# Patient Record
Sex: MALE | Race: NATIVE HAWAIIAN OR OTHER PACIFIC ISLANDER | URBAN - NONMETROPOLITAN AREA
[De-identification: names, ages, dates, MRNs, and addresses within clinical notes are randomized per-mention and may not be internally consistent; named-entity substitution may affect disease eponyms.]

---

## 2021-02-12 ENCOUNTER — OFFICE VISIT (OUTPATIENT)
Dept: URBAN - NONMETROPOLITAN AREA CLINIC 4 | Facility: CLINIC | Age: 76
End: 2021-02-12
Payer: COMMERCIAL

## 2021-02-12 ENCOUNTER — LAB OUTSIDE AN ENCOUNTER (OUTPATIENT)
Dept: URBAN - NONMETROPOLITAN AREA CLINIC 4 | Facility: CLINIC | Age: 76
End: 2021-02-12

## 2021-02-12 VITALS
BODY MASS INDEX: 23.99 KG/M2 | HEIGHT: 73 IN | TEMPERATURE: 97 F | SYSTOLIC BLOOD PRESSURE: 113 MMHG | WEIGHT: 181 LBS | DIASTOLIC BLOOD PRESSURE: 68 MMHG | HEART RATE: 66 BPM

## 2021-02-12 DIAGNOSIS — R63.4 WEIGHT LOSS: ICD-10-CM

## 2021-02-12 DIAGNOSIS — I10 HTN (HYPERTENSION): ICD-10-CM

## 2021-02-12 DIAGNOSIS — R19.7 DIARRHEA: ICD-10-CM

## 2021-02-12 DIAGNOSIS — F03.90 DEMENTIA: ICD-10-CM

## 2021-02-12 DIAGNOSIS — J44.9 COPD (CHRONIC OBSTRUCTIVE PULMONARY DISEASE): ICD-10-CM

## 2021-02-12 DIAGNOSIS — N18.9 CKD (CHRONIC KIDNEY DISEASE): ICD-10-CM

## 2021-02-12 DIAGNOSIS — E11.9 DIABETES: ICD-10-CM

## 2021-02-12 PROCEDURE — G8482 FLU IMMUNIZE ORDER/ADMIN: HCPCS | Performed by: REGISTERED NURSE

## 2021-02-12 PROCEDURE — 1036F TOBACCO NON-USER: CPT | Performed by: REGISTERED NURSE

## 2021-02-12 PROCEDURE — G8754 DIAS BP LESS 90: HCPCS | Performed by: REGISTERED NURSE

## 2021-02-12 PROCEDURE — G8420 CALC BMI NORM PARAMETERS: HCPCS | Performed by: REGISTERED NURSE

## 2021-02-12 PROCEDURE — G8752 SYS BP LESS 140: HCPCS | Performed by: REGISTERED NURSE

## 2021-02-12 PROCEDURE — 99204 OFFICE O/P NEW MOD 45 MIN: CPT | Performed by: REGISTERED NURSE

## 2021-02-12 PROCEDURE — G8428 CUR MEDS NOT DOCUMENT: HCPCS | Performed by: REGISTERED NURSE

## 2021-02-12 RX ORDER — SODIUM, POTASSIUM,MAG SULFATES 17.5-3.13G
354 ML SOLUTION, RECONSTITUTED, ORAL ORAL
Qty: 354 ML | Refills: 0 | OUTPATIENT
Start: 2021-02-12 | End: 2021-02-13

## 2021-02-12 NOTE — HPI-TODAY'S VISIT:
2/12/21: Pt is a 76 yo male with PMH of HTN, DM, COPD, CKD, dementia, former smoker who was referred by Dr. Wallis for chronic diarrhea and weight loss.   Pt reports diarrhea with urgency over the past 3-4 months. Typically has 6 BMs daily. Denies any abdominal pain, N/V, hematochezia, melena, nocturnal stools or incontinence. He has lost over 100 lbs in the past year due to progressive dementia. He has a good appetite. He has never had a colonoscopy. Denies FHx of CRC or colon polyps. Stool studies on 1/27/21 checked by PCP were unremarkable. Hgb noted at 13.5. Denies etoh, tobacco or drug use.

## 2021-03-22 ENCOUNTER — CLAIMS CREATED FROM THE CLAIM WINDOW (OUTPATIENT)
Dept: URBAN - METROPOLITAN AREA CLINIC 4 | Facility: CLINIC | Age: 76
End: 2021-03-22
Payer: COMMERCIAL

## 2021-03-22 ENCOUNTER — OFFICE VISIT (OUTPATIENT)
Dept: URBAN - METROPOLITAN AREA SURGERY CENTER 14 | Facility: SURGERY CENTER | Age: 76
End: 2021-03-22
Payer: COMMERCIAL

## 2021-03-22 DIAGNOSIS — K52.9 ACUTE COLITIS: ICD-10-CM

## 2021-03-22 DIAGNOSIS — K63.5 BENIGN COLON POLYP: ICD-10-CM

## 2021-03-22 DIAGNOSIS — K63.89 MASS OF HEPATIC FLEXURE OF COLON: ICD-10-CM

## 2021-03-22 PROCEDURE — 45385 COLONOSCOPY W/LESION REMOVAL: CPT | Performed by: INTERNAL MEDICINE

## 2021-03-22 PROCEDURE — 88305 TISSUE EXAM BY PATHOLOGIST: CPT | Performed by: PATHOLOGY

## 2021-03-22 PROCEDURE — G8907 PT DOC NO EVENTS ON DISCHARG: HCPCS | Performed by: INTERNAL MEDICINE

## 2021-03-22 PROCEDURE — 45380 COLONOSCOPY AND BIOPSY: CPT | Performed by: INTERNAL MEDICINE

## 2021-03-22 PROCEDURE — 88342 IMHCHEM/IMCYTCHM 1ST ANTB: CPT | Performed by: PATHOLOGY

## 2021-03-22 PROCEDURE — 88313 SPECIAL STAINS GROUP 2: CPT | Performed by: PATHOLOGY

## 2021-04-02 ENCOUNTER — DASHBOARD ENCOUNTERS (OUTPATIENT)
Age: 76
End: 2021-04-02

## 2021-04-02 ENCOUNTER — WEB ENCOUNTER (OUTPATIENT)
Dept: URBAN - NONMETROPOLITAN AREA CLINIC 4 | Facility: CLINIC | Age: 76
End: 2021-04-02

## 2021-04-02 ENCOUNTER — OFFICE VISIT (OUTPATIENT)
Dept: URBAN - NONMETROPOLITAN AREA CLINIC 4 | Facility: CLINIC | Age: 76
End: 2021-04-02
Payer: COMMERCIAL

## 2021-04-02 VITALS
WEIGHT: 181.6 LBS | TEMPERATURE: 97.3 F | BODY MASS INDEX: 24.07 KG/M2 | HEART RATE: 68 BPM | DIASTOLIC BLOOD PRESSURE: 60 MMHG | HEIGHT: 73 IN | SYSTOLIC BLOOD PRESSURE: 108 MMHG

## 2021-04-02 DIAGNOSIS — I10 HTN (HYPERTENSION): ICD-10-CM

## 2021-04-02 DIAGNOSIS — K63.5 HYPERPLASTIC POLYP OF SIGMOID COLON: ICD-10-CM

## 2021-04-02 DIAGNOSIS — R19.7 DIARRHEA: ICD-10-CM

## 2021-04-02 DIAGNOSIS — F03.90 DEMENTIA: ICD-10-CM

## 2021-04-02 DIAGNOSIS — N18.9 CKD (CHRONIC KIDNEY DISEASE): ICD-10-CM

## 2021-04-02 DIAGNOSIS — E11.9 DIABETES: ICD-10-CM

## 2021-04-02 DIAGNOSIS — J44.9 COPD (CHRONIC OBSTRUCTIVE PULMONARY DISEASE): ICD-10-CM

## 2021-04-02 PROBLEM — 709044004 CHRONIC KIDNEY DISEASE: Status: ACTIVE | Noted: 2021-02-12

## 2021-04-02 PROBLEM — 52448006 DEMENTIA: Status: ACTIVE | Noted: 2021-02-12

## 2021-04-02 PROBLEM — 38341003 HYPERTENSION: Status: ACTIVE | Noted: 2021-02-12

## 2021-04-02 PROBLEM — 13645005 COPD - CHRONIC OBSTRUCTIVE PULMONARY DISEASE: Status: ACTIVE | Noted: 2021-02-12

## 2021-04-02 PROBLEM — 111552007 DIABETES MELLITUS WITHOUT COMPLICATION: Status: ACTIVE | Noted: 2021-02-12

## 2021-04-02 PROCEDURE — 99213 OFFICE O/P EST LOW 20 MIN: CPT | Performed by: REGISTERED NURSE

## 2021-04-02 NOTE — HPI-TODAY'S VISIT:
2/12/21: Pt is a 74 yo male with PMH of HTN, DM, COPD, CKD, dementia, former smoker who was referred by Dr. Wallis for chronic diarrhea and weight loss.   Pt reports diarrhea with urgency over the past 3-4 months. Typically has 6 BMs daily. Denies any abdominal pain, N/V, hematochezia, melena, nocturnal stools or incontinence. He has lost over 100 lbs in the past year due to progressive dementia. He has a good appetite. He has never had a colonoscopy. Denies FHx of CRC or colon polyps. Stool studies on 1/27/21 checked by PCP were unremarkable. Hgb noted at 13.5. Denies etoh, tobacco or drug use.  4/2/21: Pt RTC for f/u. Underwent colonoscopy on 3/22/21 which showed one 4 mm hyperplastic polyp in sigmoid colon. Random colon bx neg for MC. Diarrhea improved. Having 2-3 BMs daily. Stools are semi-formed. Denies any other GI complaints.

## 2024-05-03 ENCOUNTER — OFFICE VISIT (OUTPATIENT)
Dept: OBGYN CLINIC | Facility: CLINIC | Age: 79
End: 2024-05-03

## 2024-05-03 ENCOUNTER — APPOINTMENT (OUTPATIENT)
Dept: RADIOLOGY | Facility: CLINIC | Age: 79
End: 2024-05-03
Payer: COMMERCIAL

## 2024-05-03 VITALS
SYSTOLIC BLOOD PRESSURE: 102 MMHG | HEIGHT: 73 IN | HEART RATE: 69 BPM | DIASTOLIC BLOOD PRESSURE: 70 MMHG | BODY MASS INDEX: 21.12 KG/M2 | WEIGHT: 159.4 LBS

## 2024-05-03 DIAGNOSIS — M54.50 LOW BACK PAIN, UNSPECIFIED BACK PAIN LATERALITY, UNSPECIFIED CHRONICITY, UNSPECIFIED WHETHER SCIATICA PRESENT: ICD-10-CM

## 2024-05-03 DIAGNOSIS — S22.31XA CLOSED FRACTURE OF ONE RIB OF RIGHT SIDE, INITIAL ENCOUNTER: Primary | ICD-10-CM

## 2024-05-03 PROCEDURE — 99204 OFFICE O/P NEW MOD 45 MIN: CPT | Performed by: ORTHOPAEDIC SURGERY

## 2024-05-03 PROCEDURE — 72100 X-RAY EXAM L-S SPINE 2/3 VWS: CPT

## 2024-05-03 RX ORDER — DONEPEZIL HYDROCHLORIDE 5 MG/1
TABLET, FILM COATED ORAL
COMMUNITY
Start: 2024-04-06

## 2024-05-03 RX ORDER — CARVEDILOL 3.12 MG/1
TABLET ORAL
COMMUNITY
Start: 2024-02-07

## 2024-05-03 RX ORDER — TAMSULOSIN HYDROCHLORIDE 0.4 MG/1
CAPSULE ORAL
COMMUNITY
Start: 2024-02-07

## 2024-05-03 RX ORDER — SEMAGLUTIDE 0.68 MG/ML
INJECTION, SOLUTION SUBCUTANEOUS
COMMUNITY
Start: 2024-03-10

## 2024-05-03 RX ORDER — FLUOXETINE HYDROCHLORIDE 20 MG/1
CAPSULE ORAL
COMMUNITY
Start: 2024-02-21

## 2024-05-03 RX ORDER — ROSUVASTATIN CALCIUM 20 MG/1
TABLET, COATED ORAL
COMMUNITY
Start: 2024-04-15

## 2024-05-03 RX ORDER — MEMANTINE HYDROCHLORIDE 10 MG/1
TABLET ORAL
COMMUNITY
Start: 2024-01-30

## 2024-05-03 RX ORDER — BLOOD SUGAR DIAGNOSTIC
STRIP MISCELLANEOUS
COMMUNITY
Start: 2024-03-02

## 2024-05-03 RX ORDER — RANOLAZINE 500 MG/1
TABLET, EXTENDED RELEASE ORAL
COMMUNITY
Start: 2024-01-30

## 2024-05-03 NOTE — PROGRESS NOTES
Assessment/Plan:  1. Closed fracture of one rib of right side, initial encounter  XR spine lumbar 2 or 3 views injury          Darwin has pain directly upon his 12th rib on the right side.  X-rays of the lumbar spine show no acute abnormality in the lumbar spine region however there does appear to be a minimally displaced fracture to the 12th rib that correlates with his fall and pain.  I recommended conservative treatment of pain control and activity as tolerated and they rib fracture should heal the next 6 weeks.  He was recommended to do topical treatment with Lidoderm patches.  He does not appear to have any urinary issues associated with renal injury at this location as well.  He will follow-up with me only if needed.    Subjective:   Darwin Ram is a 78 y.o. male who presents to the office for evaluation for lower back pain.  He presents alongside his brother who states he is recently moved in with him and over the last week Darwin has fallen in the house about 3 or 4 times.  He has landed on his back several times tripping over objects in the house.  He has had increased pain over his right lower back ever since his most recent fall.  He denies any numbness or tingling rating pain down his legs.  He points to his right flank region where he is feeling the pain.  He denies any history of back issues in the past.  He denies any hematuria dysuria.      Review of Systems   Constitutional:  Negative for chills, fever and unexpected weight change.   HENT:  Negative for hearing loss, nosebleeds and sore throat.    Eyes:  Negative for pain, redness and visual disturbance.   Respiratory:  Negative for cough, shortness of breath and wheezing.    Cardiovascular:  Negative for chest pain, palpitations and leg swelling.   Gastrointestinal:  Negative for abdominal pain, nausea and vomiting.   Endocrine: Negative for polyphagia and polyuria.   Genitourinary:  Negative for dysuria and hematuria.   Musculoskeletal:          See HPI   Skin:  Negative for rash and wound.   Neurological:  Negative for dizziness, numbness and headaches.   Psychiatric/Behavioral:  Negative for decreased concentration and suicidal ideas. The patient is not nervous/anxious.          Past Medical History:   Diagnosis Date    Dementia (HCC)     Diabetes (HCC)     Hypertension        Past Surgical History:   Procedure Laterality Date    BACK SURGERY      CARDIAC SURGERY         Family History   Problem Relation Age of Onset    Stroke Father     Breast cancer Sister     Prostate cancer Brother     Skin cancer Brother        Social History     Occupational History    Not on file   Tobacco Use    Smoking status: Never    Smokeless tobacco: Never   Vaping Use    Vaping status: Never Used   Substance and Sexual Activity    Alcohol use: Never    Drug use: Never    Sexual activity: Not on file         Current Outpatient Medications:     carvedilol (COREG) 3.125 mg tablet, , Disp: , Rfl:     donepezil (ARICEPT) 5 mg tablet, , Disp: , Rfl:     FLUoxetine (PROzac) 20 mg capsule, , Disp: , Rfl:     memantine (NAMENDA) 10 mg tablet, , Disp: , Rfl:     OneTouch Ultra test strip, , Disp: , Rfl:     Ozempic, 0.25 or 0.5 MG/DOSE, 2 MG/3ML injection pen, , Disp: , Rfl:     ranolazine (RANEXA) 500 mg 12 hr tablet, , Disp: , Rfl:     rosuvastatin (CRESTOR) 20 MG tablet, , Disp: , Rfl:     tamsulosin (FLOMAX) 0.4 mg, , Disp: , Rfl:     No Known Allergies    Objective:  Vitals:    05/03/24 1429   BP: 102/70   Pulse: 69     Pain Score:   9      Back Exam     Tenderness   Back tenderness location: Tenderness palpation over 12th rib on the right side.  No rib tenderness on the left side.  No midline spinal tenderness.    Range of Motion   Extension:  normal   Flexion:  normal     Muscle Strength   Right Quadriceps:  5/5   Left Quadriceps:  5/5   Right Hamstrings:  5/5   Left Hamstrings:  5/5     Tests   Straight leg raise right: negative  Straight leg raise left: negative    Other    Sensation: normal  Gait: normal             Physical Exam  Vitals reviewed.   Constitutional:       Appearance: He is well-developed.   HENT:      Head: Normocephalic and atraumatic.   Eyes:      Conjunctiva/sclera: Conjunctivae normal.      Pupils: Pupils are equal, round, and reactive to light.   Cardiovascular:      Rate and Rhythm: Normal rate.      Pulses: Normal pulses.   Pulmonary:      Effort: Pulmonary effort is normal. No respiratory distress.   Musculoskeletal:      Cervical back: Normal range of motion and neck supple.      Lumbar back: Negative right straight leg raise test and negative left straight leg raise test.      Comments: As noted in HPI   Skin:     General: Skin is warm and dry.   Neurological:      General: No focal deficit present.      Mental Status: He is alert and oriented to person, place, and time.   Psychiatric:         Mood and Affect: Mood normal.         Behavior: Behavior normal.         I have personally reviewed pertinent films in PACS and my interpretation is as follows:  X-rays of the lumbar spine demonstrate no acute abnormality of the lumbar spine.  Fracture visible in the 12th rib in both AP and lateral view.      This document was created using speech voice recognition software.   Grammatical errors, random word insertions, pronoun errors, and incomplete sentences are an occasional consequence of this system due to software limitations, ambient noise, and hardware issues.   Any formal questions or concerns about content, text, or information contained within the body of this dictation should be directly addressed to the provider for clarification.

## 2024-05-13 ENCOUNTER — OFFICE VISIT (OUTPATIENT)
Dept: FAMILY MEDICINE CLINIC | Facility: CLINIC | Age: 79
End: 2024-05-13
Payer: COMMERCIAL

## 2024-05-13 VITALS
TEMPERATURE: 96 F | BODY MASS INDEX: 21 KG/M2 | DIASTOLIC BLOOD PRESSURE: 60 MMHG | SYSTOLIC BLOOD PRESSURE: 90 MMHG | WEIGHT: 150 LBS | HEIGHT: 71 IN | HEART RATE: 92 BPM | RESPIRATION RATE: 20 BRPM

## 2024-05-13 DIAGNOSIS — E11.9 TYPE 2 DIABETES MELLITUS WITHOUT COMPLICATION, WITHOUT LONG-TERM CURRENT USE OF INSULIN (HCC): ICD-10-CM

## 2024-05-13 DIAGNOSIS — I25.10 CORONARY ARTERY DISEASE INVOLVING NATIVE HEART WITHOUT ANGINA PECTORIS, UNSPECIFIED VESSEL OR LESION TYPE: ICD-10-CM

## 2024-05-13 DIAGNOSIS — F03.90 DEMENTIA, UNSPECIFIED DEMENTIA SEVERITY, UNSPECIFIED DEMENTIA TYPE, UNSPECIFIED WHETHER BEHAVIORAL, PSYCHOTIC, OR MOOD DISTURBANCE OR ANXIETY (HCC): Primary | ICD-10-CM

## 2024-05-13 DIAGNOSIS — R39.9 LOWER URINARY TRACT SYMPTOMS (LUTS): ICD-10-CM

## 2024-05-13 DIAGNOSIS — E78.2 MIXED HYPERLIPIDEMIA: ICD-10-CM

## 2024-05-13 PROBLEM — Z95.1 HISTORY OF HEART BYPASS SURGERY: Status: ACTIVE | Noted: 2024-05-13

## 2024-05-13 PROCEDURE — 99203 OFFICE O/P NEW LOW 30 MIN: CPT | Performed by: NURSE PRACTITIONER

## 2024-05-13 RX ORDER — FLUTICASONE PROPIONATE 50 MCG
1 SPRAY, SUSPENSION (ML) NASAL DAILY
COMMUNITY
Start: 2024-05-02

## 2024-05-13 RX ORDER — FEXOFENADINE HCL 180 MG/1
180 TABLET ORAL DAILY
COMMUNITY

## 2024-05-13 RX ORDER — NITROGLYCERIN 0.4 MG/1
TABLET SUBLINGUAL
COMMUNITY
Start: 2024-05-02

## 2024-05-13 NOTE — PROGRESS NOTES
"Assessment/Plan:    1. Dementia, unspecified dementia severity, unspecified dementia type, unspecified whether behavioral, psychotic, or mood disturbance or anxiety (HCC)  Comments:  on namenda    2. Coronary artery disease involving native heart without angina pectoris, unspecified vessel or lesion type  Comments:  on ranexa and coreg    3. Mixed hyperlipidemia  Comments:  on statin    4. Lower urinary tract symptoms (LUTS)  Comments:  on flomax    5. Type 2 diabetes mellitus without complication, without long-term current use of insulin (HCC)  Comments:  on ozempic and as per brother no complications and will get records            No future appointments.        Subjective:      Patient ID: Darwin Ram is a 78 y.o. male.    Chief Complaint   Patient presents with   • Establish Care     Amor LLAMAS         Vitals:  BP 90/60   Pulse 92   Temp (!) 96 °F (35.6 °C)   Resp 20   Ht 5' 10.5\" (1.791 m)   Wt 68 kg (150 lb)   BMI 21.22 kg/m²     HPI.  New to practice.  Personal and family medical history reviewed.   Patient brought by brother.  Patient has dementia and recently moved from [place] and will be going in assisted living in [place] by end of this month and will be establishing with them there with PCP and will not establishing with us . Only came here today for flu and covid-19 vaccine and not available and will be going to local pharmacy.  Had follow up with old PCP last month and will be following with them on 5/29 virtually to do forms.  Complaint with medications and tolerating it well.  Brother does not want us to order any blood work as stated that had it done last month with PCP and also had follow up with his cardiologist last month too.            PHQ-2/9 Depression Screening    Little interest or pleasure in doing things: 0 - not at all  Feeling down, depressed, or hopeless: 0 - not at all  PHQ-2 Score: 0  PHQ-2 Interpretation: Negative depression screen             The following portions of " the patient's history were reviewed and updated as appropriate: allergies, current medications, past family history, past medical history, past social history, past surgical history and problem list.      Review of Systems   HENT: Negative.     Respiratory: Negative.     Cardiovascular: Negative.    Gastrointestinal: Negative.    Genitourinary:  Negative for difficulty urinating.   Neurological:  Negative for dizziness and headaches.         Objective:    Social History     Tobacco Use   Smoking Status Former   • Average packs/day: 3.0 packs/day for 46.0 years (138.0 ttl pk-yrs)   • Types: Cigarettes   • Start date: 1954   • Passive exposure: Never   Smokeless Tobacco Never       Allergies: No Known Allergies      Current Outpatient Medications   Medication Sig Dispense Refill   • ASPIRIN 81 PO Take by mouth in the morning     • carvedilol (COREG) 3.125 mg tablet Take by mouth 2 (two) times a day with meals     • Cyanocobalamin (VITAMIN B 12 PO) Take by mouth in the morning     • donepezil (ARICEPT) 5 mg tablet Take 5 mg by mouth daily at bedtime     • fexofenadine (ALLEGRA) 180 MG tablet Take 180 mg by mouth daily     • FLUoxetine (PROzac) 20 mg capsule Take 20 mg by mouth daily     • fluticasone (FLONASE) 50 mcg/act nasal spray 1 spray into each nostril daily     • memantine (NAMENDA) 10 mg tablet 10 mg 2 (two) times a day     • nitroglycerin (NITROSTAT) 0.4 mg SL tablet Place under the tongue every 5 (five) minutes as needed     • Omega-3 Fatty Acids (FISH OIL CONCENTRATE PO) Take by mouth 2 (two) times a day     • OneTouch Ultra test strip      • Ozempic, 0.25 or 0.5 MG/DOSE, 2 MG/3ML injection pen 0.25 mg every 7 days     • ranolazine (RANEXA) 500 mg 12 hr tablet in the morning     • rosuvastatin (CRESTOR) 20 MG tablet Take 20 mg by mouth daily     • tamsulosin (FLOMAX) 0.4 mg 0.4 mg daily with dinner       No current facility-administered medications for this visit.          Physical Exam  Constitutional:        Appearance: Normal appearance.   HENT:      Head: Normocephalic.      Nose: Nose normal.   Eyes:      Conjunctiva/sclera: Conjunctivae normal.   Cardiovascular:      Rate and Rhythm: Normal rate and regular rhythm.      Heart sounds: Normal heart sounds.   Pulmonary:      Effort: Pulmonary effort is normal.      Breath sounds: Normal breath sounds.   Musculoskeletal:         General: No swelling or tenderness. Normal range of motion.   Skin:     General: Skin is warm and dry.      Findings: No rash.   Neurological:      Mental Status: He is alert and oriented to person, place, and time.   Psychiatric:         Mood and Affect: Mood normal.         Behavior: Behavior normal.         Thought Content: Thought content normal.         Judgment: Judgment normal.                     AZEEM Brennan

## 2024-05-13 NOTE — PATIENT INSTRUCTIONS
Medicare Preventive Visit Patient Instructions  Thank you for completing your Welcome to Medicare Visit or Medicare Annual Wellness Visit today. Your next wellness visit will be due in one year (5/14/2025).  The screening/preventive services that you may require over the next 5-10 years are detailed below. Some tests may not apply to you based off risk factors and/or age. Screening tests ordered at today's visit but not completed yet may show as past due. Also, please note that scanned in results may not display below.  Preventive Screenings:  Service Recommendations Previous Testing/Comments   Colorectal Cancer Screening  Colonoscopy    Fecal Occult Blood Test (FOBT)/Fecal Immunochemical Test (FIT)  Fecal DNA/Cologuard Test  Flexible Sigmoidoscopy Age: 45-75 years old   Colonoscopy: every 10 years (May be performed more frequently if at higher risk)  OR  FOBT/FIT: every 1 year  OR  Cologuard: every 3 years  OR  Sigmoidoscopy: every 5 years  Screening may be recommended earlier than age 45 if at higher risk for colorectal cancer. Also, an individualized decision between you and your healthcare provider will decide whether screening between the ages of 76-85 would be appropriate. Colonoscopy: Not on file  FOBT/FIT: Not on file  Cologuard: Not on file  Sigmoidoscopy: Not on file          Prostate Cancer Screening Individualized decision between patient and health care provider in men between ages of 55-69   Medicare will cover every 12 months beginning on the day after your 50th birthday PSA: No results in last 5 years     Screening Not Indicated     Hepatitis C Screening Once for adults born between 1945 and 1965  More frequently in patients at high risk for Hepatitis C Hep C Antibody: Not on file        Diabetes Screening 1-2 times per year if you're at risk for diabetes or have pre-diabetes Fasting glucose: No results in last 5 years (No results in last 5 years)  A1C: No results in last 5 years (No results in last  5 years)      Cholesterol Screening Once every 5 years if you don't have a lipid disorder. May order more often based on risk factors. Lipid panel: Not on file         Other Preventive Screenings Covered by Medicare:  Abdominal Aortic Aneurysm (AAA) Screening: covered once if your at risk. You're considered to be at risk if you have a family history of AAA or a male between the age of 65-75 who smoking at least 100 cigarettes in your lifetime.  Lung Cancer Screening: covers low dose CT scan once per year if you meet all of the following conditions: (1) Age 55-77; (2) No signs or symptoms of lung cancer; (3) Current smoker or have quit smoking within the last 15 years; (4) You have a tobacco smoking history of at least 20 pack years (packs per day x number of years you smoked); (5) You get a written order from a healthcare provider.  Glaucoma Screening: covered annually if you're considered high risk: (1) You have diabetes OR (2) Family history of glaucoma OR (3)  aged 50 and older OR (4)  American aged 65 and older  Osteoporosis Screening: covered every 2 years if you meet one of the following conditions: (1) Have a vertebral abnormality; (2) On glucocorticoid therapy for more than 3 months; (3) Have primary hyperparathyroidism; (4) On osteoporosis medications and need to assess response to drug therapy.  HIV Screening: covered annually if you're between the age of 15-65. Also covered annually if you are younger than 15 and older than 65 with risk factors for HIV infection. For pregnant patients, it is covered up to 3 times per pregnancy.    Immunizations:  Immunization Recommendations   Influenza Vaccine Annual influenza vaccination during flu season is recommended for all persons aged >= 6 months who do not have contraindications   Pneumococcal Vaccine   * Pneumococcal conjugate vaccine = PCV13 (Prevnar 13), PCV15 (Vaxneuvance), PCV20 (Prevnar 20)  * Pneumococcal polysaccharide vaccine =  PPSV23 (Pneumovax) Adults 19-65 yo with certain risk factors or if 65+ yo  If never received any pneumonia vaccine: recommend Prevnar 20 (PCV20)  Give PCV20 if previously received 1 dose of PCV13 or PPSV23   Hepatitis B Vaccine 3 dose series if at intermediate or high risk (ex: diabetes, end stage renal disease, liver disease)   Respiratory syncytial virus (RSV) Vaccine - COVERED BY MEDICARE PART D  * RSVPreF3 (Arexvy) CDC recommends that adults 60 years of age and older may receive a single dose of RSV vaccine using shared clinical decision-making (SCDM)   Tetanus (Td) Vaccine - COST NOT COVERED BY MEDICARE PART B Following completion of primary series, a booster dose should be given every 10 years to maintain immunity against tetanus. Td may also be given as tetanus wound prophylaxis.   Tdap Vaccine - COST NOT COVERED BY MEDICARE PART B Recommended at least once for all adults. For pregnant patients, recommended with each pregnancy.   Shingles Vaccine (Shingrix) - COST NOT COVERED BY MEDICARE PART B  2 shot series recommended in those 19 years and older who have or will have weakened immune systems or those 50 years and older     Health Maintenance Due:      Topic Date Due   • Hepatitis C Screening  Never done     Immunizations Due:      Topic Date Due   • Pneumococcal Vaccine: 65+ Years (1 of 1 - PCV) Never done   • COVID-19 Vaccine (1 - 2023-24 season) Never done     Advance Directives   What are advance directives?  Advance directives are legal documents that state your wishes and plans for medical care. These plans are made ahead of time in case you lose your ability to make decisions for yourself. Advance directives can apply to any medical decision, such as the treatments you want, and if you want to donate organs.   What are the types of advance directives?  There are many types of advance directives, and each state has rules about how to use them. You may choose a combination of any of the  following:  Living will:  This is a written record of the treatment you want. You can also choose which treatments you do not want, which to limit, and which to stop at a certain time. This includes surgery, medicine, IV fluid, and tube feedings.   Durable power of  for healthcare (DPAHC):  This is a written record that states who you want to make healthcare choices for you when you are unable to make them for yourself. This person, called a proxy, is usually a family member or a friend. You may choose more than 1 proxy.  Do not resuscitate (DNR) order:  A DNR order is used in case your heart stops beating or you stop breathing. It is a request not to have certain forms of treatment, such as CPR. A DNR order may be included in other types of advance directives.  Medical directive:  This covers the care that you want if you are in a coma, near death, or unable to make decisions for yourself. You can list the treatments you want for each condition. Treatment may include pain medicine, surgery, blood transfusions, dialysis, IV or tube feedings, and a ventilator (breathing machine).  Values history:  This document has questions about your views, beliefs, and how you feel and think about life. This information can help others choose the care that you would choose.  Why are advance directives important?  An advance directive helps you control your care. Although spoken wishes may be used, it is better to have your wishes written down. Spoken wishes can be misunderstood, or not followed. Treatments may be given even if you do not want them. An advance directive may make it easier for your family to make difficult choices about your care.   Fall Prevention    Fall prevention  includes ways to make your home and other areas safer. It also includes ways you can move more carefully to prevent a fall. Health conditions that cause changes in your blood pressure, vision, or muscle strength and coordination may increase  your risk for falls. Medicines may also increase your risk for falls if they make you dizzy, weak, or sleepy.   Fall prevention tips:   Stand or sit up slowly.    Use assistive devices as directed.    Wear shoes that fit well and have soles that .    Wear a personal alarm.    Stay active.    Manage your medical conditions.    Home Safety Tips:  Add items to prevent falls in the bathroom.    Keep paths clear.    Install bright lights in your home.    Keep items you use often on shelves within reach.    Paint or place reflective tape on the edges of your stairs.       © Copyright [employer] 2018 Information is for End User's use only and may not be sold, redistributed or otherwise used for commercial purposes. All illustrations and images included in CareNotes® are the copyrighted property of [employer] or [employer]

## 2024-05-13 NOTE — PROGRESS NOTES
Assessment and Plan:     Problem List Items Addressed This Visit    None  Visit Diagnoses     Medicare annual wellness visit, subsequent    -  Primary             Preventive health issues were discussed with patient, and age appropriate screening tests were ordered as noted in patient's After Visit Summary.  Personalized health advice and appropriate referrals for health education or preventive services given if needed, as noted in patient's After Visit Summary.     History of Present Illness:     Patient presents for a Medicare Wellness Visit    HPI   New to practice.  Personal and family medical history reviewed.   Brought by brother  Cardiologist last month.  PCP last month.      Patient Care Team:  AZEEM Brennan as PCP - General (Family Medicine)     Review of Systems:     Review of Systems     Problem List:     Patient Active Problem List   Diagnosis   • History of heart bypass surgery      Past Medical and Surgical History:     Past Medical History:   Diagnosis Date   • Dementia (HCC)    • Diabetes (HCC)    • Hypertension      Past Surgical History:   Procedure Laterality Date   • BACK SURGERY     • CARDIAC SURGERY        Family History:     Family History   Problem Relation Age of Onset   • Stroke Father    • Breast cancer Sister    • Prostate cancer Brother    • Skin cancer Brother       Social History:     Social History     Socioeconomic History   • Marital status:      Spouse name: None   • Number of children: None   • Years of education: None   • Highest education level: None   Occupational History   • None   Tobacco Use   • Smoking status: Former     Average packs/day: 3.0 packs/day for 46.0 years (138.0 ttl pk-yrs)     Types: Cigarettes     Start date: 1955     Passive exposure: Never   • Smokeless tobacco: Never   Vaping Use   • Vaping status: Never Used   Substance and Sexual Activity   • Alcohol use: Never   • Drug use: Never   • Sexual activity: None   Other Topics Concern   • None    Social History Narrative   • None     Social Determinants of Health     Financial Resource Strain: Not on file   Food Insecurity: No Food Insecurity (5/13/2024)    Hunger Vital Sign    • Worried About Running Out of Food in the Last Year: Never true    • Ran Out of Food in the Last Year: Never true   Transportation Needs: No Transportation Needs (5/13/2024)    PRAPARE - Transportation    • Lack of Transportation (Medical): No    • Lack of Transportation (Non-Medical): No   Physical Activity: Not on file   Stress: Not on file   Social Connections: Not on file   Intimate Partner Violence: Not on file   Housing Stability: Low Risk  (5/13/2024)    Housing Stability Vital Sign    • Unable to Pay for Housing in the Last Year: No    • Number of Places Lived in the Last Year: 2    • Unstable Housing in the Last Year: No      Medications and Allergies:     Current Outpatient Medications   Medication Sig Dispense Refill   • ASPIRIN 81 PO Take by mouth in the morning     • carvedilol (COREG) 3.125 mg tablet Take by mouth 2 (two) times a day with meals     • Cyanocobalamin (VITAMIN B 12 PO) Take by mouth in the morning     • donepezil (ARICEPT) 5 mg tablet Take 5 mg by mouth daily at bedtime     • fexofenadine (ALLEGRA) 180 MG tablet Take 180 mg by mouth daily     • FLUoxetine (PROzac) 20 mg capsule Take 20 mg by mouth daily     • fluticasone (FLONASE) 50 mcg/act nasal spray 1 spray into each nostril daily     • memantine (NAMENDA) 10 mg tablet 10 mg 2 (two) times a day     • nitroglycerin (NITROSTAT) 0.4 mg SL tablet Place under the tongue every 5 (five) minutes as needed     • Omega-3 Fatty Acids (FISH OIL CONCENTRATE PO) Take by mouth 2 (two) times a day     • OneTouch Ultra test strip      • Ozempic, 0.25 or 0.5 MG/DOSE, 2 MG/3ML injection pen 0.25 mg every 7 days     • ranolazine (RANEXA) 500 mg 12 hr tablet in the morning     • rosuvastatin (CRESTOR) 20 MG tablet Take 20 mg by mouth daily     • tamsulosin (FLOMAX) 0.4  mg 0.4 mg daily with dinner       No current facility-administered medications for this visit.     No Known Allergies   Immunizations:       There is no immunization history on file for this patient.   Health Maintenance:         Topic Date Due   • Hepatitis C Screening  Never done         Topic Date Due   • Pneumococcal Vaccine: 65+ Years (1 of 1 - PCV) Never done   • COVID-19 Vaccine (1 - 2023-24 season) Never done      Medicare Screening Tests and Risk Assessments:     Darwin is here for his Subsequent Wellness visit.     Health Risk Assessment:   Patient rates overall health as poor. Patient feels that their physical health rating is slightly worse. Patient is very satisfied with their life. Eyesight was rated as same. Hearing was rated as same. Patient feels that their emotional and mental health rating is much worse. Patients states they are always angry. Patient states they are always unusually tired/fatigued. Pain experienced in the last 7 days has been a lot. Patient's pain rating has been 10/10. Patient states that he has experienced weight loss or gain in last 6 months.     Depression Screening:   PHQ-2 Score: 0      Fall Risk Screening:   In the past year, patient has experienced: history of falling in past year    Number of falls: 2 or more  Injured during fall?: Yes    Feels unsteady when standing or walking?: Yes    Worried about falling?: Yes      Home Safety:  Patient does not have trouble with stairs inside or outside of their home. Patient has working smoke alarms and has working carbon monoxide detector. Home safety hazards include: none.     Nutrition:   Current diet is Diabetic.     Medications:   Patient is currently taking over-the-counter supplements. OTC medications include: see medication list. Patient is not able to manage medications.     Activities of Daily Living (ADLs)/Instrumental Activities of Daily Living (IADLs):   Walk and transfer into and out of bed and chair?: Yes  Dress and  groom yourself?: Yes    Bathe or shower yourself?: Yes    Feed yourself? Yes  Do your laundry/housekeeping?: Yes  Manage your money, pay your bills and track your expenses?: No  Make your own meals?: No    Do your own shopping?: Yes    Previous Hospitalizations:   Any hospitalizations or ED visits within the last 12 months?: Yes    How many hospitalizations have you had in the last year?: 1-2    Advance Care Planning:   Living will: No    Durable POA for healthcare: Yes    Advanced directive: No    Advanced directive counseling given: Yes    ACP document given: Yes    Patient declined ACP directive: No      Cognitive Screening:   Provider or family/friend/caregiver concerned regarding cognition?: Yes    Cognition Comments: History of dementia and managed by neurologist    PREVENTIVE SCREENINGS      Cardiovascular Screening:    General: Risks and Benefits Discussed      Diabetes Screening:     General: Risks and Benefits Discussed      Colorectal Cancer Screening:     General: Risks and Benefits Discussed and Patient Declines      Prostate Cancer Screening:    General: Screening Not Indicated      Osteoporosis Screening:    General: Screening Not Indicated      Abdominal Aortic Aneurysm (AAA) Screening:    Risk factors include: tobacco use        Lung Cancer Screening:     General: Screening Not Indicated      Hepatitis C Screening:    General: Risks and Benefits Discussed    Screening, Brief Intervention, and Referral to Treatment (SBIRT)    Screening      AUDIT-C Screenin) How often did you have a drink containing alcohol in the past year? never  2) How many drinks did you have on a typical day when you were drinking in the past year? 0  3) How often did you have 6 or more drinks on one occasion in the past year? never    AUDIT-C Score: 0  Interpretation: Score 0-3 (male): Negative screen for alcohol misuse    Single Item Drug Screening:  How often have you used an illegal drug (including marijuana) or a  "prescription medication for non-medical reasons in the past year? never    Single Item Drug Screen Score: 0  Interpretation: Negative screen for possible drug use disorder    Brief Intervention  Alcohol & drug use screenings were reviewed. No concerns regarding substance use disorder identified.     Other Counseling Topics:   Car/seat belt/driving safety, skin self-exam, sunscreen and calcium and vitamin D intake and regular weightbearing exercise.     No results found.     Physical Exam:     BP 90/60   Pulse 92   Temp (!) 96 °F (35.6 °C)   Resp 20   Ht 5' 10.5\" (1.791 m)   Wt 68 kg (150 lb)   BMI 21.22 kg/m²     Physical Exam     AZEEM Brennan  "

## 2024-05-14 ENCOUNTER — TELEPHONE (OUTPATIENT)
Age: 79
End: 2024-05-14

## 2024-05-14 NOTE — TELEPHONE ENCOUNTER
Patient needs to schedule a TB test as a requirement for assisted living. Brother spoke with previous doctor (Dr. Alexander in [place]) who confirms he has never been tested for TB.

## 2024-05-21 ENCOUNTER — CLINICAL SUPPORT (OUTPATIENT)
Dept: FAMILY MEDICINE CLINIC | Facility: CLINIC | Age: 79
End: 2024-05-21
Payer: COMMERCIAL

## 2024-05-21 DIAGNOSIS — Z11.1 SCREENING FOR TUBERCULOSIS: Primary | ICD-10-CM

## 2024-05-21 PROCEDURE — 86580 TB INTRADERMAL TEST: CPT

## 2024-05-23 ENCOUNTER — CLINICAL SUPPORT (OUTPATIENT)
Dept: FAMILY MEDICINE CLINIC | Facility: CLINIC | Age: 79
End: 2024-05-23

## 2024-05-23 DIAGNOSIS — Z11.1 PPD SCREENING TEST: Primary | ICD-10-CM

## 2024-05-23 LAB
INDURATION: 0 MM
TB SKIN TEST: NEGATIVE

## 2024-06-05 ENCOUNTER — TELEPHONE (OUTPATIENT)
Age: 79
End: 2024-06-05

## 2024-06-05 NOTE — TELEPHONE ENCOUNTER
Pt's brother called to schedule a 2nd TB test for pt.  Order is in for first one, but not second one.  Please place order, then call brother to schedule nurse visit.  Thank you.

## 2024-06-05 NOTE — TELEPHONE ENCOUNTER
Patient just came to us to get TB testing as going to nursing home and please give him second TB . AZEEM Brennan

## 2025-05-31 ENCOUNTER — APPOINTMENT (EMERGENCY)
Dept: RADIOLOGY | Facility: HOSPITAL | Age: 80
End: 2025-05-31
Payer: COMMERCIAL

## 2025-05-31 ENCOUNTER — HOSPITAL ENCOUNTER (EMERGENCY)
Facility: HOSPITAL | Age: 80
Discharge: HOME | End: 2025-05-31
Attending: EMERGENCY MEDICINE | Admitting: EMERGENCY MEDICINE
Payer: COMMERCIAL

## 2025-05-31 VITALS
HEART RATE: 70 BPM | DIASTOLIC BLOOD PRESSURE: 59 MMHG | SYSTOLIC BLOOD PRESSURE: 120 MMHG | OXYGEN SATURATION: 99 % | RESPIRATION RATE: 18 BRPM | TEMPERATURE: 97.4 F
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
## 2025-05-31 DIAGNOSIS — S09.90XA INJURY OF HEAD, INITIAL ENCOUNTER: ICD-10-CM

## 2025-05-31 LAB
2HR DELTA HS TROPONIN: 3 NG/L
4HR DELTA HS TROPONIN: 3 NG/L
ALBUMIN SERPL BCG-MCNC: 3.6 G/DL (ref 3.5–5)
ALP SERPL-CCNC: 85 U/L (ref 34–104)
ALT SERPL W P-5'-P-CCNC: 10 U/L (ref 7–52)
ANION GAP SERPL CALCULATED.3IONS-SCNC: 8 MMOL/L (ref 4–13)
AST SERPL W P-5'-P-CCNC: 14 U/L (ref 13–39)
ATRIAL RATE: 300 BPM
BACTERIA UR QL AUTO: NORMAL /HPF
BASOPHILS # BLD AUTO: 0.07 THOUSANDS/ÂΜL (ref 0–0.1)
BASOPHILS NFR BLD AUTO: 1 % (ref 0–1)
BILIRUB SERPL-MCNC: 0.73 MG/DL (ref 0.2–1)
BILIRUB UR QL STRIP: NEGATIVE
BUN SERPL-MCNC: 17 MG/DL (ref 5–25)
CALCIUM SERPL-MCNC: 9.2 MG/DL (ref 8.4–10.2)
CARDIAC TROPONIN I PNL SERPL HS: 13 NG/L (ref ?–50)
CARDIAC TROPONIN I PNL SERPL HS: 16 NG/L (ref ?–50)
CARDIAC TROPONIN I PNL SERPL HS: 16 NG/L (ref ?–50)
CHLORIDE SERPL-SCNC: 105 MMOL/L (ref 96–108)
CLARITY UR: CLEAR
CO2 SERPL-SCNC: 27 MMOL/L (ref 21–32)
COLOR UR: ABNORMAL
CREAT SERPL-MCNC: 0.9 MG/DL (ref 0.6–1.3)
EOSINOPHIL # BLD AUTO: 0.16 THOUSAND/ÂΜL (ref 0–0.61)
EOSINOPHIL NFR BLD AUTO: 1 % (ref 0–6)
ERYTHROCYTE [DISTWIDTH] IN BLOOD BY AUTOMATED COUNT: 13.1 % (ref 11.6–15.1)
GFR SERPL CREATININE-BSD FRML MDRD: 80 ML/MIN/1.73SQ M
GLUCOSE SERPL-MCNC: 154 MG/DL (ref 65–140)
GLUCOSE SERPL-MCNC: 189 MG/DL (ref 65–140)
GLUCOSE UR STRIP-MCNC: ABNORMAL MG/DL
HCT VFR BLD AUTO: 36.3 % (ref 36.5–49.3)
HGB BLD-MCNC: 12 G/DL (ref 12–17)
HGB UR QL STRIP.AUTO: NEGATIVE
IMM GRANULOCYTES # BLD AUTO: 0.05 THOUSAND/UL (ref 0–0.2)
IMM GRANULOCYTES NFR BLD AUTO: 0 % (ref 0–2)
KETONES UR STRIP-MCNC: ABNORMAL MG/DL
LEUKOCYTE ESTERASE UR QL STRIP: NEGATIVE
LYMPHOCYTES # BLD AUTO: 1.04 THOUSANDS/ÂΜL (ref 0.6–4.47)
LYMPHOCYTES NFR BLD AUTO: 7 % (ref 14–44)
MCH RBC QN AUTO: 29.9 PG (ref 26.8–34.3)
MCHC RBC AUTO-ENTMCNC: 33.1 G/DL (ref 31.4–37.4)
MCV RBC AUTO: 90 FL (ref 82–98)
MONOCYTES # BLD AUTO: 0.77 THOUSAND/ÂΜL (ref 0.17–1.22)
MONOCYTES NFR BLD AUTO: 6 % (ref 4–12)
NEUTROPHILS # BLD AUTO: 11.95 THOUSANDS/ÂΜL (ref 1.85–7.62)
NEUTS SEG NFR BLD AUTO: 85 % (ref 43–75)
NITRITE UR QL STRIP: NEGATIVE
NON-SQ EPI CELLS URNS QL MICRO: NORMAL /HPF
NRBC BLD AUTO-RTO: 0 /100 WBCS
PH UR STRIP.AUTO: 6.5 [PH]
PLATELET # BLD AUTO: 217 THOUSANDS/UL (ref 149–390)
PMV BLD AUTO: 9.7 FL (ref 8.9–12.7)
POTASSIUM SERPL-SCNC: 3.9 MMOL/L (ref 3.5–5.3)
PROT SERPL-MCNC: 6.9 G/DL (ref 6.4–8.4)
PROT UR STRIP-MCNC: ABNORMAL MG/DL
QRS AXIS: 22 DEGREES
QRSD INTERVAL: 74 MS
QT INTERVAL: 364 MS
QTC INTERVAL: 457 MS
RBC # BLD AUTO: 4.02 MILLION/UL (ref 3.88–5.62)
RBC #/AREA URNS AUTO: NORMAL /HPF
SODIUM SERPL-SCNC: 140 MMOL/L (ref 135–147)
SP GR UR STRIP.AUTO: 1.03 (ref 1–1.03)
T WAVE AXIS: 172 DEGREES
UROBILINOGEN UR STRIP-ACNC: <2 MG/DL
VENTRICULAR RATE: 95 BPM
WBC # BLD AUTO: 14.04 THOUSAND/UL (ref 4.31–10.16)
WBC #/AREA URNS AUTO: NORMAL /HPF

## 2025-05-31 PROCEDURE — 99285 EMERGENCY DEPT VISIT HI MDM: CPT

## 2025-05-31 PROCEDURE — 71045 X-RAY EXAM CHEST 1 VIEW: CPT

## 2025-05-31 PROCEDURE — 84484 ASSAY OF TROPONIN QUANT: CPT | Performed by: STUDENT IN AN ORGANIZED HEALTH CARE EDUCATION/TRAINING PROGRAM

## 2025-05-31 PROCEDURE — 71260 CT THORAX DX C+: CPT

## 2025-05-31 PROCEDURE — 82948 REAGENT STRIP/BLOOD GLUCOSE: CPT

## 2025-05-31 PROCEDURE — 80053 COMPREHEN METABOLIC PANEL: CPT | Performed by: STUDENT IN AN ORGANIZED HEALTH CARE EDUCATION/TRAINING PROGRAM

## 2025-05-31 PROCEDURE — 70496 CT ANGIOGRAPHY HEAD: CPT

## 2025-05-31 PROCEDURE — 72125 CT NECK SPINE W/O DYE: CPT

## 2025-05-31 PROCEDURE — 93005 ELECTROCARDIOGRAM TRACING: CPT

## 2025-05-31 PROCEDURE — 72170 X-RAY EXAM OF PELVIS: CPT

## 2025-05-31 PROCEDURE — 81001 URINALYSIS AUTO W/SCOPE: CPT | Performed by: STUDENT IN AN ORGANIZED HEALTH CARE EDUCATION/TRAINING PROGRAM

## 2025-05-31 PROCEDURE — 36415 COLL VENOUS BLD VENIPUNCTURE: CPT | Performed by: STUDENT IN AN ORGANIZED HEALTH CARE EDUCATION/TRAINING PROGRAM

## 2025-05-31 PROCEDURE — 74177 CT ABD & PELVIS W/CONTRAST: CPT

## 2025-05-31 PROCEDURE — 93010 ELECTROCARDIOGRAM REPORT: CPT | Performed by: INTERNAL MEDICINE

## 2025-05-31 PROCEDURE — 70498 CT ANGIOGRAPHY NECK: CPT

## 2025-05-31 PROCEDURE — 85025 COMPLETE CBC W/AUTO DIFF WBC: CPT | Performed by: STUDENT IN AN ORGANIZED HEALTH CARE EDUCATION/TRAINING PROGRAM

## 2025-05-31 PROCEDURE — 99285 EMERGENCY DEPT VISIT HI MDM: CPT | Performed by: EMERGENCY MEDICINE

## 2025-05-31 RX ADMIN — IOHEXOL 85 ML: 350 INJECTION, SOLUTION INTRAVENOUS at 10:00

## 2025-05-31 RX ADMIN — IOHEXOL 100 ML: 350 INJECTION, SOLUTION INTRAVENOUS at 12:12

## 2025-05-31 NOTE — ED ATTENDING ATTESTATION
5/31/2025  I, Arun Dangelo MD, saw and evaluated the patient. I have discussed the patient with the resident/non-physician practitioner and agree with the resident's/non-physician practitioner's findings, Plan of Care, and MDM as documented in the resident's/non-physician practitioner's note, except where noted. All available labs and Radiology studies were reviewed.  I was present for key portions of any procedure(s) performed by the resident/non-physician practitioner and I was immediately available to provide assistance.       At this point I agree with the current assessment done in the Emergency Department.  I have conducted an independent evaluation of this patient a history and physical is as follows:    79-year-old man with history of dementia presenting from skilled nursing facility.  Patient was found facedown on the floor in his bathroom today but was responsive.  Unclear what might have caused him to fall.  There were no external signs of trauma and it was unknown if he had head strike.  The patient did complain of some head pain on arrival but would also answer yes when I asked him questions about pain to the entirety of his body.  Patient does not appear to be in any distress, is pleasant and frequently laughing.  Head is atraumatic normocephalic.  No C, T or L-spine tenderness.  He is in a c-collar.  Heart regular rate and rhythm, no murmurs rubs gallops.  Lungs clear to auscultation bilaterally.  Abdomen soft, NT, nondistended.  Able to move all extremities without pain.  No gross focal neurological deficit.  Patient did have what we questioned is some slurred speech initially, however his brother came to the emergency department and states this is normal for him and that he is at his normal baseline.  Imaging showed some old possible C-spine fractures, these were discussed with trauma and with neurosurgery with recommendations that no acute intervention was needed and that no c-collar was  needed.  There was finding of some mediastinal adenopathy with dedicated chest CT imaging recommended, and so we are getting CT chest, abdomen and pelvis.  EKG and labs have been overall reassuring.  Disposition pending CT chest, abdomen pelvis.    ED Course         Critical Care Time  Procedures

## 2025-05-31 NOTE — DISCHARGE INSTRUCTIONS
Please return to the emergency department for any of the worrisome symptoms which we discussed during your visit today.

## 2025-06-04 NOTE — ED PROVIDER NOTES
Time reflects when diagnosis was documented in both MDM as applicable and the Disposition within this note       Time User Action Codes Description Comment    5/31/2025  5:30 PM Antonio Velasquez Add [W19.XXXA] Fall, initial encounter     5/31/2025  5:30 PM Antonio Velasquez Add [S09.90XA] Injury of head, initial encounter           ED Disposition       ED Disposition   Discharge    Condition   Stable    Date/Time   Sat May 31, 2025  5:30 PM    Comment   Darwin Ram discharge to home/self care.                   Assessment & Plan       Medical Decision Making  79-year-old male with history of dementia presents to the emergency department today from his nursing facility after unwitnessed fall with unknown downtime and unknown head strike.  Patient is severely demented and has no recollection of the event.  He is alert to person but not place or time.  He has no apparent distress on examination but does complain of some neck pain.  Hemodynamically stable with reassuring vitals.  On initial examination, there is no signs of traumatic injury.  He is in atrial fibrillation with clear lungs on auscultation.  Moving all limbs spontaneously.  Dysarthria noted which his brother states is baseline.  Given patient's history and presentation, concern for intracranial hemorrhage versus possible cerebrovascular accident.  CT of the head and neck, CT spine were ordered for evaluation.  CT C-spine showed what appeared to be chronic fractures at the anterior inferior portions of C5 and C6.  CT of the head did not reveal any acute intracranial hemorrhage or abnormalities/large vessel occlusions.  There was some perihilar lymphadenopathy noted on the CT of the neck which prompted ordering of CT of the chest abdomen pelvis.  No acute findings noted on CT of the chest abdomen pelvis that require emergency medicine intervention.  Trauma and neurosurgery are both contacted regarding the fractures of C5 and C6 and both services agreed there is no  indication for admission or further intervention.  They will follow patient as a outpatient.  Patient's laboratory findings were also largely reassuring.  EKG revealed rate of 95 with no ST elevations or depressions consistent with ACS.  Atrial fibrillation noted on EKG.  Results of the workup were discussed with the patient and his brother.  No indication for admission at this time.  Brother feels comfortable with taking the patient back to his facility where they can further monitor him.  Will follow-up as an outpatient with the neurosurgical service.  Patient remained hemodynamically stable while under my care and is appropriate for discharge home with outpatient follow-up instructions and strict emergency department return precautions.    Amount and/or Complexity of Data Reviewed  Labs: ordered.  Radiology: ordered.    Risk  Prescription drug management.             Medications   iohexol (OMNIPAQUE) 350 MG/ML injection (MULTI-DOSE) 85 mL (85 mL Intravenous Given 5/31/25 1000)   iohexol (OMNIPAQUE) 350 MG/ML injection (MULTI-DOSE) 100 mL (100 mL Intravenous Given 5/31/25 1212)       ED Risk Strat Scores                    No data recorded                            History of Present Illness       Chief Complaint   Patient presents with    Fall     Pt was found on the floor supine, possible unwitnessed fall. Unknown loc, unknown hs, on eliquis.  Pt complains of head neck and back pain.       Past Medical History[1]   Past Surgical History[2]   Family History[3]   Social History[4]   E-Cigarette/Vaping    E-Cigarette Use Never User       E-Cigarette/Vaping Substances    Nicotine No     THC No     CBD No     Flavoring No     Other No     Unknown No       I have reviewed and agree with the history as documented.     79-year-old male presents to the emergency department today for evaluation of fall.  Patient has severe dementia and is unsure how long he was on the ground for, unsure if he struck his head.  Fall was  unwitnessed by staff at his facility.  Brother is here and assist with history.  Tells me the patient has had progressive dementia and over the past month or so has developed worsening aphasia.  He tells me that he appears to be at his baseline at time of presentation.  Patient complaining of neck pain but no other complaints.        Review of Systems   Unable to perform ROS: Dementia   Respiratory:  Negative for shortness of breath.    Gastrointestinal:  Negative for abdominal pain.   Musculoskeletal:  Positive for neck pain. Negative for back pain.   Neurological:  Negative for dizziness.           Objective       ED Triage Vitals [05/31/25 0914]   Temperature Pulse Blood Pressure Respirations SpO2 Patient Position - Orthostatic VS   (!) 97.4 °F (36.3 °C) (!) 115 137/85 18 99 % Lying      Temp Source Heart Rate Source BP Location FiO2 (%) Pain Score    Oral Monitor Left arm -- No Pain      Vitals      Date and Time Temp Pulse SpO2 Resp BP Pain Score FACES Pain Rating User   05/31/25 1730 -- 70 99 % 18 120/59 -- --    05/31/25 1700 -- 72 99 % 18 137/58 -- -- SD   05/31/25 1645 -- 73 99 % -- -- -- -- SD   05/31/25 1630 -- 73 99 % -- 138/62 No Pain -- SD   05/31/25 1615 -- 133 92 % 18 140/70 -- -- SD   05/31/25 1530 -- 72 Simultaneous filing. User may not have seen previous data. -- 18 -- No Pain -- SD   05/31/25 1530 -- -- 99 % -- 147/68 -- -- NH   05/31/25 1430 -- 70 99 % 18 131/85 No Pain --    05/31/25 1300 -- 101 91 % -- -- -- -- SD   05/31/25 1245 -- 109 94 % 20 -- -- -- SD   05/31/25 1230 -- 109 91 % 20 -- -- -- SD   05/31/25 1200 -- 71 Simultaneous filing. User may not have seen previous data. -- -- -- -- -- SD   05/31/25 1200 -- -- 97 % 20 127/62 No Pain -- MD   05/31/25 1116 -- 84 98 % 18 113/79 No Pain -- SD   05/31/25 1045 -- 108 98 % 16 163/68 No Pain -- SD   05/31/25 1030 -- 70 98 % 16 158/70 -- -- SD   05/31/25 1026 -- 88 97 % 18 110/71 -- -- SD   05/31/25 1000 -- -- -- -- -- No Pain -- SD    05/31/25 0945 -- 71 98 % 18 144/67 No Pain -- SD   05/31/25 0930 -- 71 98 % -- 138/64 -- -- SD   05/31/25 0917 -- 114 99 % 22 123/67 No Pain -- SD   05/31/25 0914 97.4 °F (36.3 °C) 115 99 % 18 137/85 No Pain -- SD            Physical Exam  Vitals reviewed.   Constitutional:       General: He is not in acute distress.     Appearance: Normal appearance.   HENT:      Head: Normocephalic and atraumatic.      Right Ear: Tympanic membrane, ear canal and external ear normal.      Left Ear: Tympanic membrane, ear canal and external ear normal.      Mouth/Throat:      Mouth: Mucous membranes are moist.      Pharynx: Oropharynx is clear.     Eyes:      Extraocular Movements: Extraocular movements intact.      Pupils: Pupils are equal, round, and reactive to light.       Cardiovascular:      Rate and Rhythm: Normal rate. Rhythm irregular.      Pulses: Normal pulses.      Heart sounds: Normal heart sounds. No murmur heard.     No friction rub.   Pulmonary:      Effort: Pulmonary effort is normal. No respiratory distress.      Breath sounds: Normal breath sounds. No wheezing, rhonchi or rales.   Chest:      Chest wall: No tenderness.   Abdominal:      General: Abdomen is flat. There is no distension.      Palpations: Abdomen is soft.      Tenderness: There is no abdominal tenderness. There is no guarding.     Musculoskeletal:         General: No tenderness, deformity or signs of injury.      Cervical back: Normal range of motion. No rigidity.      Right lower leg: No edema.      Left lower leg: No edema.     Skin:     General: Skin is warm and dry.      Comments: Scattered abrasions and scabs noted on the patient's extremities.  Appear chronic from patient picking his skin.     Neurological:      General: No focal deficit present.      Mental Status: He is alert. He is disoriented.      Cranial Nerves: No cranial nerve deficit.      Motor: No weakness.      Coordination: Coordination normal.         Results Reviewed        Procedure Component Value Units Date/Time    Fingerstick Glucose (POCT) [[id number]]  (Abnormal) Collected: 05/31/25 1615    Lab Status: Final result Specimen: Blood Updated: 05/31/25 1616     POC Glucose 154 mg/dl     HS Troponin I 4hr [[id number]]  (Normal) Collected: 05/31/25 1357    Lab Status: Final result Specimen: Blood from Arm, Left Updated: 05/31/25 1429     hs TnI 4hr 16 ng/L      Delta 4hr hsTnI 3 ng/L     HS Troponin I 2hr [[id number]]  (Normal) Collected: 05/31/25 1155    Lab Status: Final result Specimen: Blood from Arm, Left Updated: 05/31/25 1236     hs TnI 2hr 16 ng/L      Delta 2hr hsTnI 3 ng/L     Urine Microscopic [[id number]]  (Normal) Collected: 05/31/25 1112    Lab Status: Final result Specimen: Urine, Clean Catch Updated: 05/31/25 1145     RBC, UA 1-2 /hpf      WBC, UA 1-2 /hpf      Epithelial Cells Occasional /hpf      Bacteria, UA None Seen /hpf     UA w Reflex to Microscopic w Reflex to Culture [[id number]]  (Abnormal) Collected: 05/31/25 1112    Lab Status: Final result Specimen: Urine, Clean Catch Updated: 05/31/25 1142     Color, UA Light Yellow     Clarity, UA Clear     Specific Gravity, UA 1.030     pH, UA 6.5     Leukocytes, UA Negative     Nitrite, UA Negative     Protein, UA Trace mg/dl      Glucose, UA Trace mg/dl      Ketones, UA Trace mg/dl      Urobilinogen, UA <2.0 mg/dl      Bilirubin, UA Negative     Occult Blood, UA Negative    HS Troponin 0hr (reflex protocol) [[id number]]  (Normal) Collected: 05/31/25 0937    Lab Status: Final result Specimen: Blood from Arm, Left Updated: 05/31/25 1012     hs TnI 0hr 13 ng/L     Comprehensive metabolic panel [[id number]]  (Abnormal) Collected: 05/31/25 0937    Lab Status: Final result Specimen: Blood from Arm, Left Updated: 05/31/25 1007     Sodium 140 mmol/L      Potassium 3.9 mmol/L      Chloride 105 mmol/L      CO2 27 mmol/L      ANION GAP 8 mmol/L      BUN 17 mg/dL      Creatinine 0.90 mg/dL      Glucose 189 mg/dL      Calcium 9.2 mg/dL       AST 14 U/L      ALT 10 U/L      Alkaline Phosphatase 85 U/L      Total Protein 6.9 g/dL      Albumin 3.6 g/dL      Total Bilirubin 0.73 mg/dL      eGFR 80 ml/min/1.73sq m     Narrative:      [employer] Kid[employer] guidelines for Chronic Kidney Disease (CKD):     Stage 1 with normal or high GFR (GFR > 90 mL/min/1.73 square meters)    Stage 2 Mild CKD (GFR = 60-89 mL/min/1.73 square meters)    Stage 3A Moderate CKD (GFR = 45-59 mL/min/1.73 square meters)    Stage 3B Moderate CKD (GFR = 30-44 mL/min/1.73 square meters)    Stage 4 Severe CKD (GFR = 15-29 mL/min/1.73 square meters)    Stage 5 End Stage CKD (GFR <15 mL/min/1.73 square meters)  Note: GFR calculation is accurate only with a steady state creatinine    CBC and differential [[id number]]  (Abnormal) Collected: 05/31/25 0937    Lab Status: Final result Specimen: Blood from Arm, Left Updated: 05/31/25 0946     WBC 14.04 Thousand/uL      RBC 4.02 Million/uL      Hemoglobin 12.0 g/dL      Hematocrit 36.3 %      MCV 90 fL      MCH 29.9 pg      MCHC 33.1 g/dL      RDW 13.1 %      MPV 9.7 fL      Platelets 217 Thousands/uL      nRBC 0 /100 WBCs      Segmented % 85 %      Immature Grans % 0 %      Lymphocytes % 7 %      Monocytes % 6 %      Eosinophils Relative 1 %      Basophils Relative 1 %      Absolute Neutrophils 11.95 Thousands/µL      Absolute Immature Grans 0.05 Thousand/uL      Absolute Lymphocytes 1.04 Thousands/µL      Absolute Monocytes 0.77 Thousand/µL      Eosinophils Absolute 0.16 Thousand/µL      Basophils Absolute 0.07 Thousands/µL             CT chest abdomen pelvis w contrast   Final Interpretation by Alec Deluna DO (05/31 1642)      1.  No CT evidence of no acute process within the abdomen or pelvis.   2.  Pulmonary emphysema with scattered areas of honeycombing and fibrotic changes. Similar findings were also reported on outside CT chest of February 2021, however, images from that exam are not available for direct comparison  at the time of this    dictation.   3.  Prominent and enlarged mediastinal and hilar lymph nodes. These nodes are of indeterminate etiology and may be reactive, however, reassessment on 2 to 3-month follow-up is recommended to ensure stability.   4.  Age-indeterminate C6 fracture, better appreciated on concurrent CT cervical spine.         The study was marked in EPIC for immediate notification.      Computerized Assisted Algorithm (CAA) may have aided analysis of applicable images.      Resident: CAROLEE CALLOWAY I, the attending radiologist, have reviewed the images and agree with the final report above.      Workstation performed: [id number]         XR chest 1 view portable   Final Interpretation by Ruth Tello MD (05/31 6134)      No acute cardiopulmonary disease.      Pulmonary fibrosis.      No acute displaced fractures.            Workstation performed: [id number]         XR pelvis ap only 1 or 2 vw   Final Interpretation by Alec Deluna DO (06/01 1838)      No evidence of acute osseous abnormality.         Computerized Assisted Algorithm (CAA) may have been used to analyze all applicable images.         Resident: Shadi Lan I, the attending radiologist, have reviewed the images and agree with the final report above.      Workstation performed: [id number]         CTA head and neck w wo contrast   Final Interpretation by Yash Napier (05/31 1113)      CT Brain: No acute intracranial CT abnormality.      CT Angiography:      1.  Patent major vessels of the [place] of [place] without high-grade stenosis. No aneurysm.   2.  Severe atherosclerotic stenosis at the left vertebral artery origin. Moderate stenosis at the right vertebral artery origin.   3.  About 50% atherosclerotic stenosis in the proximal cervical ICAs.      Other:      1.  Age-indeterminate likely chronic fracture at the left anteroinferior corner of the level C6 at the site of anterior bridging osteophyte. There is smaller but  similar finding along the anterior inferior corner at level C5.   2.  Mildly prominent left laryngeal ventricle and mild medial rotation of the left arytenoid which can be seen in vocal cord paralysis. Recommend follow-up with ENT service.   3.  Chronic interstitial changes in the periphery of the partially imaged lungs (right greater than left).   4.  Nonspecific lymphadenopathy in the partially imaged mediastinum. Correlate with prior outside images. If no prior imaging is available, recommend follow-up with dedicated chest CT.                        Workstation performed: [id number]         CT spine cervical without contrast   Final Interpretation by Guera Madera MD ( 1111)      Age-indeterminate likely chronic fracture at the left anteroinferior corner of the level C6 at the site of anterior bridging osteophyte. There is smaller but similar finding along the anterior inferior corner at level C5.                              I personally discussed this study with Antonio Velasquez MD on 2025 11:10 AM.                        Workstation performed: [id number]             Procedures    ED Medication and Procedure Management   Prior to Admission Medications   Prescriptions Last Dose Informant Patient Reported? Taking?   ASPIRIN 81 PO   Yes No   Sig: Take by mouth in the morning   Cyanocobalamin (VITAMIN B 12 PO)   Yes No   Sig: Take by mouth in the morning   FLUoxetine (PROzac) 20 mg capsule   Yes No   Sig: Take 20 mg by mouth daily   Omega-3 Fatty Acids (FISH OIL CONCENTRATE PO)   Yes No   Sig: Take by mouth 2 (two) times a day   OneTouch Ultra test strip   Yes No   Ozempic, 0.25 or 0.5 MG/DOSE, 2 MG/3ML injection pen   Yes No   Si.25 mg every 7 days   carvedilol (COREG) 3.125 mg tablet   Yes No   Sig: Take by mouth 2 (two) times a day with meals   donepezil (ARICEPT) 5 mg tablet   Yes No   Sig: Take 5 mg by mouth daily at bedtime   fexofenadine (ALLEGRA) 180 MG tablet   Yes No   Sig: Take 180 mg by mouth  daily   fluticasone (FLONASE) 50 mcg/act nasal spray   Yes No   Si spray into each nostril daily   memantine (NAMENDA) 10 mg tablet   Yes No   Sig: 10 mg 2 (two) times a day   nitroglycerin (NITROSTAT) 0.4 mg SL tablet   Yes No   Sig: Place under the tongue every 5 (five) minutes as needed   ranolazine (RANEXA) 500 mg 12 hr tablet   Yes No   Sig: in the morning   rosuvastatin (CRESTOR) 20 MG tablet   Yes No   Sig: Take 20 mg by mouth daily   tamsulosin (FLOMAX) 0.4 mg   Yes No   Si.4 mg daily with dinner      Facility-Administered Medications: None     Discharge Medication List as of 2025  5:30 PM        CONTINUE these medications which have NOT CHANGED    Details   ASPIRIN 81 PO Take by mouth in the morning, Historical Med      carvedilol (COREG) 3.125 mg tablet Take by mouth 2 (two) times a day with meals, Starting 2024, Historical Med      Cyanocobalamin (VITAMIN B 12 PO) Take by mouth in the morning, Historical Med      donepezil (ARICEPT) 5 mg tablet Take 5 mg by mouth daily at bedtime, Starting Sat 2024, Historical Med      fexofenadine (ALLEGRA) 180 MG tablet Take 180 mg by mouth daily, Historical Med      FLUoxetine (PROzac) 20 mg capsule Take 20 mg by mouth daily, Starting 2024, Historical Med      fluticasone (FLONASE) 50 mcg/act nasal spray 1 spray into each nostril daily, Starting 2024, Historical Med      memantine (NAMENDA) 10 mg tablet 10 mg 2 (two) times a day, Starting 2024, Historical Med      nitroglycerin (NITROSTAT) 0.4 mg SL tablet Place under the tongue every 5 (five) minutes as needed, Starting u 2024, Historical Med      Omega-3 Fatty Acids (FISH OIL CONCENTRATE PO) Take by mouth 2 (two) times a day, Historical Med      OneTouch Ultra test strip Historical Med      Ozempic, 0.25 or 0.5 MG/DOSE, 2 MG/3ML injection pen 0.25 mg every 7 days, Starting Sun 3/10/2024, Historical Med      ranolazine (RANEXA) 500 mg 12 hr tablet in the  morning, Starting Tue 1/30/2024, Historical Med      rosuvastatin (CRESTOR) 20 MG tablet Take 20 mg by mouth daily, Starting Mon 4/15/2024, Historical Med      tamsulosin (FLOMAX) 0.4 mg 0.4 mg daily with dinner, Starting Wed 2/7/2024, Historical Med           No discharge procedures on file.  ED SEPSIS DOCUMENTATION   Time reflects when diagnosis was documented in both MDM as applicable and the Disposition within this note       Time User Action Codes Description Comment    5/31/2025  5:30 PM Antonio Velasquez [W19.XXXA] Fall, initial encounter     5/31/2025  5:30 PM Antonio Velasquez [S09.90XA] Injury of head, initial encounter                    [1]   Past Medical History:  Diagnosis Date    Dementia (HCC)     Diabetes (HCC)     Hypertension    [2]   Past Surgical History:  Procedure Laterality Date    BACK SURGERY      CARDIAC SURGERY     [3]   Family History  Problem Relation Name Age of Onset    Stroke Father      Breast cancer Sister      Prostate cancer Brother      Skin cancer Brother     [4]   Social History  Tobacco Use    Smoking status: Former     Average packs/day: 3.0 packs/day for 46.0 years (138.0 ttl pk-yrs)     Types: Cigarettes     Start date: 1954     Passive exposure: Never    Smokeless tobacco: Never   Vaping Use    Vaping status: Never Used   Substance Use Topics    Alcohol use: Never    Drug use: Never        Antonio Velasquez MD  06/04/25 1522

## 2025-07-01 ENCOUNTER — HOSPITAL ENCOUNTER (EMERGENCY)
Facility: HOSPITAL | Age: 80
Discharge: HOME | End: 2025-07-01
Attending: EMERGENCY MEDICINE | Admitting: EMERGENCY MEDICINE
Payer: COMMERCIAL

## 2025-07-01 VITALS
OXYGEN SATURATION: 100 % | SYSTOLIC BLOOD PRESSURE: 99 MMHG | TEMPERATURE: 97.3 F | HEART RATE: 78 BPM | DIASTOLIC BLOOD PRESSURE: 53 MMHG | RESPIRATION RATE: 19 BRPM

## 2025-07-01 DIAGNOSIS — R47.01 APHASIA: ICD-10-CM

## 2025-07-01 DIAGNOSIS — F03.90 DEMENTIA (HCC): Primary | ICD-10-CM

## 2025-07-01 PROCEDURE — 99284 EMERGENCY DEPT VISIT MOD MDM: CPT

## 2025-07-01 PROCEDURE — 99284 EMERGENCY DEPT VISIT MOD MDM: CPT | Performed by: EMERGENCY MEDICINE

## 2025-07-01 NOTE — ED ATTENDING ATTESTATION
7/1/2025  I, Aj Jaramillo MD, saw and evaluated the patient. I have discussed the patient with the resident/non-physician practitioner and agree with the resident's/non-physician practitioner's findings, Plan of Care, and MDM as documented in the resident's/non-physician practitioner's note, except where noted. All available labs and Radiology studies were reviewed.  I was present for key portions of any procedure(s) performed by the resident/non-physician practitioner and I was immediately available to provide assistance.       At this point I agree with the current assessment done in the Emergency Department.  I have conducted an independent evaluation of this patient a history and physical is as follows:    ED Course   Emergency Department Note- Darwin Ram 79 y.o. male MRN: [id number]    Unit/Bed#: QCB Encounter: [id number]    Darwin Ram is a 79 y.o. male who presents with   Chief Complaint   Patient presents with    Altered Mental Status     Pt comes from [facility] for AMS. Per EMS, staff at the facility stated he was physically and verbally aggressive. Pt is currently calm and cooperative. Some expressive aphasia noticed. EMS was unable to get an answer from staff on whether the aphasia is new.          History of Present Illness   HPI:  Darwin Ram is a 79 y.o. male who presents for evaluation of:  Episode of agitation in [facility], his living facility, that prompted the visit to the ED.  The patient has a history of chronic expressive aphasia.  By the time the patient arrived in the ED, the patient was noted to be calm and cooperative.  He denies any specific complaints.  Further history is unobtainable secondary to the patient's chronic expressive aphasia.    Review of Systems   Unable to perform ROS: Dementia       Historical Information   Past Medical History[1]  Past Surgical History[2]  Social History   Social History     Substance and Sexual Activity   Alcohol Use Never      Social History     Substance and Sexual Activity   Drug Use Never     Tobacco Use History[3]  Family History: Family History[4]    Meds/Allergies   PTA meds:   Prior to Admission Medications   Prescriptions Last Dose Informant Patient Reported? Taking?   ASPIRIN 81 PO   Yes No   Sig: Take by mouth in the morning   Cyanocobalamin (VITAMIN B 12 PO)   Yes No   Sig: Take by mouth in the morning   FLUoxetine (PROzac) 20 mg capsule   Yes No   Sig: Take 20 mg by mouth daily   Omega-3 Fatty Acids (FISH OIL CONCENTRATE PO)   Yes No   Sig: Take by mouth 2 (two) times a day   OneTouch Ultra test strip   Yes No   Ozempic, 0.25 or 0.5 MG/DOSE, 2 MG/3ML injection pen   Yes No   Si.25 mg every 7 days   carvedilol (COREG) 3.125 mg tablet   Yes No   Sig: Take by mouth 2 (two) times a day with meals   donepezil (ARICEPT) 5 mg tablet   Yes No   Sig: Take 5 mg by mouth daily at bedtime   fexofenadine (ALLEGRA) 180 MG tablet   Yes No   Sig: Take 180 mg by mouth daily   fluticasone (FLONASE) 50 mcg/act nasal spray   Yes No   Si spray into each nostril daily   memantine (NAMENDA) 10 mg tablet   Yes No   Sig: 10 mg 2 (two) times a day   nitroglycerin (NITROSTAT) 0.4 mg SL tablet   Yes No   Sig: Place under the tongue every 5 (five) minutes as needed   ranolazine (RANEXA) 500 mg 12 hr tablet   Yes No   Sig: in the morning   rosuvastatin (CRESTOR) 20 MG tablet   Yes No   Sig: Take 20 mg by mouth daily   tamsulosin (FLOMAX) 0.4 mg   Yes No   Si.4 mg daily with dinner      Facility-Administered Medications: None     Allergies[5]    Objective   First Vitals:   Blood Pressure: 99/53 (25)  Pulse: 78 (25)  Temperature: (!) 97.3 °F (36.3 °C) (25)  Temp Source: Oral (25)  Respirations: 18 (25)  SpO2: 100 % (25)    Current Vitals:   Blood Pressure: 99/53 (25)  Pulse: 78 (25)  Temperature: (!) 97.3 °F (36.3 °C) (25)  Temp Source: Oral  "(25)  Respirations: 18 (25)  SpO2: 100 % (25)    No intake or output data in the 24 hours ending 25    Invasive Devices       None                   Physical Exam  Vitals and nursing note reviewed.   Constitutional:       General: He is not in acute distress.     Appearance: Normal appearance. He is well-developed.   HENT:      Head: Normocephalic and atraumatic.      Right Ear: External ear normal.      Left Ear: External ear normal.      Nose: Nose normal.      Mouth/Throat:      Pharynx: No oropharyngeal exudate.     Eyes:      Conjunctiva/sclera: Conjunctivae normal.      Pupils: Pupils are equal, round, and reactive to light.       Cardiovascular:      Rate and Rhythm: Normal rate and regular rhythm.   Pulmonary:      Effort: Pulmonary effort is normal. No respiratory distress.   Abdominal:      General: Abdomen is flat. There is no distension.      Palpations: Abdomen is soft.     Musculoskeletal:         General: No deformity. Normal range of motion.      Cervical back: Normal range of motion and neck supple.     Skin:     General: Skin is warm and dry.      Capillary Refill: Capillary refill takes less than 2 seconds.     Neurological:      General: No focal deficit present.      Mental Status: He is alert and oriented to person, place, and time. Mental status is at baseline.      Coordination: Coordination normal.     Psychiatric:         Mood and Affect: Mood normal.         Behavior: Behavior normal.         Thought Content: Thought content normal.         Judgment: Judgment normal.           Medical Decision Makin. Agitation episode with h/p chronic expressive aphasia: patient is calm and cooperative currently.     No results found for this or any previous visit (from the past 36 hours).  No orders to display         Portions of the record may have been created with voice recognition software. Occasional wrong word or \"sound a like\" substitutions may have " occurred due to the inherent limitations of voice recognition software.  Read the chart carefully and recognize, using context, where substitutions have occurred.          Critical Care Time  Procedures           [1]   Past Medical History:  Diagnosis Date    Dementia (HCC)     Diabetes (HCC)     Hypertension    [2]   Past Surgical History:  Procedure Laterality Date    BACK SURGERY      CARDIAC SURGERY     [3]   Social History  Tobacco Use   Smoking Status Former    Average packs/day: 3.0 packs/day for 46.0 years (138.0 ttl pk-yrs)    Types: Cigarettes    Start date: 1954    Passive exposure: Never   Smokeless Tobacco Never   [4]   Family History  Problem Relation Name Age of Onset    Stroke Father      Breast cancer Sister      Prostate cancer Brother      Skin cancer Brother     [5] No Known Allergies

## 2025-07-01 NOTE — DISCHARGE INSTRUCTIONS
You have been seen in the emergency department for evaluation of agitation and expressive aphasia. Given your medical history, these symptoms are unlikely to be explained by any acute underlying medical reason.  Please follow-up with your primary care doctor regarding today's visit and return to the emergency department for any new or ongoing symptoms.

## 2025-07-02 NOTE — ED NOTES
Patient's brother, Zhen, is to come pick patient up. States he needs about 30 minutes to arrive.      Antwan Mitchell RN  07/01/25 2016

## 2025-07-04 NOTE — ED PROVIDER NOTES
Time reflects when diagnosis was documented in both MDM as applicable and the Disposition within this note       Time User Action Codes Description Comment    7/1/2025  7:12 PM Josefa Aldridge Add [F03.90] Dementia (HCC)     7/1/2025  8:07 PM Josefa Aldridge [R47.01] Aphasia           ED Disposition       ED Disposition   Discharge    Condition   Stable    Date/Time   Tue Jul 1, 2025  8:07 PM    Comment   Darwin Gutierresk discharge to home/self care.                   Assessment & Plan       Medical Decision Making  Patient is an 79-year-old male, past medical history significant for dementia presenting today for evaluation of agitation/aggression for which has resolved at this time.  Patient has no complaints, no abnormal physical exam findings that are different from his baseline to suggest stroke, infection, or otherwise underlying pathology, and warrants no further medical workup at this time.             Medications - No data to display    ED Risk Strat Scores                    No data recorded                            History of Present Illness       Chief Complaint   Patient presents with    Altered Mental Status     Pt comes from [facility] for AMS. Per EMS, staff at the facility stated he was physically and verbally aggressive. Pt is currently calm and cooperative. Some expressive aphasia noticed. EMS was unable to get an answer from staff on whether the aphasia is new.        Past Medical History[1]   Past Surgical History[2]   Family History[3]   Social History[4]   E-Cigarette/Vaping    E-Cigarette Use Never User       E-Cigarette/Vaping Substances    Nicotine No     THC No     CBD No     Flavoring No     Other No     Unknown No       I have reviewed and agree with the history as documented.     Patient is an 79-year-old male, past medical history significant for dementia, diabetes, hypertension, waxing and waning expressive aphasia presenting today for evaluation of agitation.  Patient coming  in from facility where he was found to be aggressive with staff both physically and verbally.  Staff at [facility] could not answer if patient's aphasia was new or baseline.  However, on chart check this appears to be baseline, patient additionally states that sometimes he has difficulty with his words.  Patient currently with no complaints.  Calm and cooperative.  Would like to be discharged.  Patient does report remembering being agitated with staff at facility because they were not doing what he wanted them to do.      Altered Mental Status      Review of Systems   Unable to perform ROS: Dementia           Objective       ED Triage Vitals [07/01/25 1843]   Temperature Pulse Blood Pressure Respirations SpO2 Patient Position - Orthostatic VS   (!) 97.3 °F (36.3 °C) 78 99/53 18 100 % Lying      Temp Source Heart Rate Source BP Location FiO2 (%) Pain Score    Oral Monitor Right arm -- --      Vitals      Date and Time Temp Pulse SpO2 Resp BP Pain Score FACES Pain Rating User   07/01/25 2030 -- 78 100 % 19 -- -- -- SM   07/01/25 1843 97.3 °F (36.3 °C) 78 100 % 18 99/53 -- --             Physical Exam  Vitals and nursing note reviewed.   Constitutional:       General: He is not in acute distress.     Appearance: Normal appearance. He is not ill-appearing or toxic-appearing.   HENT:      Head: Normocephalic and atraumatic.      Mouth/Throat:      Mouth: Mucous membranes are moist.     Eyes:      General: No scleral icterus.     Extraocular Movements: Extraocular movements intact.      Pupils: Pupils are equal, round, and reactive to light.       Cardiovascular:      Rate and Rhythm: Normal rate and regular rhythm.      Pulses: Normal pulses.      Heart sounds: Normal heart sounds. No murmur heard.  Pulmonary:      Effort: Pulmonary effort is normal. No respiratory distress.      Breath sounds: Normal breath sounds. No wheezing or rhonchi.   Abdominal:      General: Abdomen is flat. There is no distension.       Palpations: Abdomen is soft.      Tenderness: There is no abdominal tenderness.     Musculoskeletal:         General: Normal range of motion.      Cervical back: Normal range of motion.     Skin:     Capillary Refill: Capillary refill takes less than 2 seconds.     Neurological:      General: No focal deficit present.      Mental Status: He is alert. Mental status is at baseline.      GCS: GCS eye subscore is 4. GCS verbal subscore is 4. GCS motor subscore is 6.      Cranial Nerves: No cranial nerve deficit, dysarthria or facial asymmetry.      Sensory: Sensation is intact. No sensory deficit.      Motor: Motor function is intact. No weakness.     Psychiatric:         Mood and Affect: Mood normal.         Behavior: Behavior normal.         Results Reviewed       None            No orders to display       Procedures    ED Medication and Procedure Management   Prior to Admission Medications   Prescriptions Last Dose Informant Patient Reported? Taking?   ASPIRIN 81 PO   Yes No   Sig: Take by mouth in the morning   Cyanocobalamin (VITAMIN B 12 PO)   Yes No   Sig: Take by mouth in the morning   FLUoxetine (PROzac) 20 mg capsule   Yes No   Sig: Take 20 mg by mouth daily   Omega-3 Fatty Acids (FISH OIL CONCENTRATE PO)   Yes No   Sig: Take by mouth 2 (two) times a day   OneTouch Ultra test strip   Yes No   Ozempic, 0.25 or 0.5 MG/DOSE, 2 MG/3ML injection pen   Yes No   Si.25 mg every 7 days   carvedilol (COREG) 3.125 mg tablet   Yes No   Sig: Take by mouth 2 (two) times a day with meals   donepezil (ARICEPT) 5 mg tablet   Yes No   Sig: Take 5 mg by mouth daily at bedtime   fexofenadine (ALLEGRA) 180 MG tablet   Yes No   Sig: Take 180 mg by mouth daily   fluticasone (FLONASE) 50 mcg/act nasal spray   Yes No   Si spray into each nostril daily   memantine (NAMENDA) 10 mg tablet   Yes No   Sig: 10 mg 2 (two) times a day   nitroglycerin (NITROSTAT) 0.4 mg SL tablet   Yes No   Sig: Place under the tongue every 5 (five)  minutes as needed   ranolazine (RANEXA) 500 mg 12 hr tablet   Yes No   Sig: in the morning   rosuvastatin (CRESTOR) 20 MG tablet   Yes No   Sig: Take 20 mg by mouth daily   tamsulosin (FLOMAX) 0.4 mg   Yes No   Si.4 mg daily with dinner      Facility-Administered Medications: None     Discharge Medication List as of 2025  8:07 PM        CONTINUE these medications which have NOT CHANGED    Details   ASPIRIN 81 PO Take by mouth in the morning, Historical Med      carvedilol (COREG) 3.125 mg tablet Take by mouth 2 (two) times a day with meals, Starting 2024, Historical Med      Cyanocobalamin (VITAMIN B 12 PO) Take by mouth in the morning, Historical Med      donepezil (ARICEPT) 5 mg tablet Take 5 mg by mouth daily at bedtime, Starting Sat 2024, Historical Med      fexofenadine (ALLEGRA) 180 MG tablet Take 180 mg by mouth daily, Historical Med      FLUoxetine (PROzac) 20 mg capsule Take 20 mg by mouth daily, Starting 2024, Historical Med      fluticasone (FLONASE) 50 mcg/act nasal spray 1 spray into each nostril daily, Starting 2024, Historical Med      memantine (NAMENDA) 10 mg tablet 10 mg 2 (two) times a day, Starting 2024, Historical Med      nitroglycerin (NITROSTAT) 0.4 mg SL tablet Place under the tongue every 5 (five) minutes as needed, Starting 2024, Historical Med      Omega-3 Fatty Acids (FISH OIL CONCENTRATE PO) Take by mouth 2 (two) times a day, Historical Med      OneTouch Ultra test strip Historical Med      Ozempic, 0.25 or 0.5 MG/DOSE, 2 MG/3ML injection pen 0.25 mg every 7 days, Starting Sun 3/10/2024, Historical Med      ranolazine (RANEXA) 500 mg 12 hr tablet in the morning, Starting 2024, Historical Med      rosuvastatin (CRESTOR) 20 MG tablet Take 20 mg by mouth daily, Starting Mon 4/15/2024, Historical Med      tamsulosin (FLOMAX) 0.4 mg 0.4 mg daily with dinner, Starting 2024, Historical Med           No discharge procedures  on file.  ED SEPSIS DOCUMENTATION   Time reflects when diagnosis was documented in both MDM as applicable and the Disposition within this note       Time User Action Codes Description Comment    7/1/2025  7:12 PM Josefa Aldridge Add [F03.90] Dementia (HCC)     7/1/2025  8:07 PM Josefa Aldridge Add [R47.01] Aphasia                    [1]   Past Medical History:  Diagnosis Date    Dementia (HCC)     Diabetes (HCC)     Hypertension    [2]   Past Surgical History:  Procedure Laterality Date    BACK SURGERY      CARDIAC SURGERY     [3]   Family History  Problem Relation Name Age of Onset    Stroke Father      Breast cancer Sister      Prostate cancer Brother      Skin cancer Brother     [4]   Social History  Tobacco Use    Smoking status: Former     Average packs/day: 3.0 packs/day for 46.0 years (138.0 ttl pk-yrs)     Types: Cigarettes     Start date: 1954     Passive exposure: Never    Smokeless tobacco: Never   Vaping Use    Vaping status: Never Used   Substance Use Topics    Alcohol use: Never    Drug use: Never        Josefa Aldridge MD  07/04/25 0757